# Patient Record
Sex: FEMALE | Race: OTHER | HISPANIC OR LATINO
[De-identification: names, ages, dates, MRNs, and addresses within clinical notes are randomized per-mention and may not be internally consistent; named-entity substitution may affect disease eponyms.]

---

## 2017-09-18 ENCOUNTER — APPOINTMENT (OUTPATIENT)
Dept: ORTHOPEDIC SURGERY | Facility: CLINIC | Age: 37
End: 2017-09-18
Payer: COMMERCIAL

## 2017-09-18 VITALS — DIASTOLIC BLOOD PRESSURE: 73 MMHG | SYSTOLIC BLOOD PRESSURE: 110 MMHG | HEART RATE: 82 BPM

## 2017-09-18 DIAGNOSIS — M54.2 CERVICALGIA: ICD-10-CM

## 2017-09-18 PROCEDURE — 99204 OFFICE O/P NEW MOD 45 MIN: CPT

## 2017-09-18 PROCEDURE — 72040 X-RAY EXAM NECK SPINE 2-3 VW: CPT

## 2017-09-18 RX ORDER — DICLOFENAC SODIUM 75 MG/1
75 TABLET, DELAYED RELEASE ORAL
Qty: 90 | Refills: 1 | Status: ACTIVE | COMMUNITY
Start: 2017-09-18 | End: 1900-01-01

## 2017-11-09 ENCOUNTER — RESULT REVIEW (OUTPATIENT)
Age: 37
End: 2017-11-09

## 2018-11-15 ENCOUNTER — APPOINTMENT (OUTPATIENT)
Dept: DERMATOLOGY | Facility: CLINIC | Age: 38
End: 2018-11-15

## 2018-11-20 ENCOUNTER — RESULT REVIEW (OUTPATIENT)
Age: 38
End: 2018-11-20

## 2020-04-03 ENCOUNTER — EMERGENCY (EMERGENCY)
Facility: HOSPITAL | Age: 40
LOS: 1 days | Discharge: HOME | End: 2020-04-03
Attending: EMERGENCY MEDICINE | Admitting: EMERGENCY MEDICINE
Payer: COMMERCIAL

## 2020-04-03 VITALS
TEMPERATURE: 98 F | HEART RATE: 101 BPM | OXYGEN SATURATION: 99 % | SYSTOLIC BLOOD PRESSURE: 141 MMHG | RESPIRATION RATE: 18 BRPM | DIASTOLIC BLOOD PRESSURE: 81 MMHG

## 2020-04-03 DIAGNOSIS — O34.21 MATERNAL CARE FOR SCAR FROM PREVIOUS CESAREAN DELIVERY: Chronic | ICD-10-CM

## 2020-04-03 DIAGNOSIS — Z98.82 BREAST IMPLANT STATUS: Chronic | ICD-10-CM

## 2020-04-03 DIAGNOSIS — Z98.84 BARIATRIC SURGERY STATUS: Chronic | ICD-10-CM

## 2020-04-03 DIAGNOSIS — Z90.49 ACQUIRED ABSENCE OF OTHER SPECIFIED PARTS OF DIGESTIVE TRACT: Chronic | ICD-10-CM

## 2020-04-03 DIAGNOSIS — O03.9 COMPLETE OR UNSPECIFIED SPONTANEOUS ABORTION WITHOUT COMPLICATION: Chronic | ICD-10-CM

## 2020-04-03 DIAGNOSIS — Z98.89 OTHER SPECIFIED POSTPROCEDURAL STATES: Chronic | ICD-10-CM

## 2020-04-03 PROCEDURE — 99284 EMERGENCY DEPT VISIT MOD MDM: CPT | Mod: 25

## 2020-04-03 PROCEDURE — 93010 ELECTROCARDIOGRAM REPORT: CPT | Mod: NC

## 2020-04-03 PROCEDURE — 71045 X-RAY EXAM CHEST 1 VIEW: CPT | Mod: 26

## 2020-04-03 NOTE — ED PROVIDER NOTE - NSFOLLOWUPINSTRUCTIONS_ED_ALL_ED_FT
Chest Pain    Chest pain can be caused by many different conditions which may or may not be dangerous. Causes include heartburn, lung infections, heart attack, blood clot in lungs, skin infections, strain or damage to muscle, cartilage, or bones, etc. In addition to a history and physical examination, an electrocardiogram (ECG) or other lab tests may have been performed to determine the cause of your chest pain. Follow up with your primary care provider or with a cardiologist as instructed.     SEEK IMMEDIATE MEDICAL CARE IF YOU HAVE ANY OF THE FOLLOWING SYMPTOMS: worsening chest pain, coughing up blood, unexplained back/neck/jaw pain, severe abdominal pain, dizziness or lightheadedness, fainting, shortness of breath, sweaty or clammy skin, vomiting, or racing heart beat. These symptoms may represent a serious problem that is an emergency. Do not wait to see if the symptoms will go away. Get medical help right away. Call 911 and do not drive yourself to the hospital.    - Follow up with your primary care doctor in 1-2 days.    - Bring results with you to the appointment.   - Return to the ED for new or worsening symptoms.

## 2020-04-03 NOTE — ED ADULT TRIAGE NOTE - CHIEF COMPLAINT QUOTE
A&Ox4 ambulatory c/o chest pain worse with movement and down both arms since this morning, went to urgent care but patient was still concerned. Reports has been feeling very anxious lately. Denies PMH, ekg in progress

## 2020-04-03 NOTE — ED PROVIDER NOTE - CLINICAL SUMMARY MEDICAL DECISION MAKING FREE TEXT BOX
39F w/ chest pain, non exertional, no PMH, no family history of cardiac disease, does not appear to be ACS, lungs clear, VSS, will get ekg, cxr, tbdc 39F w/ chest pain, non exertional, no PMH, no family history of cardiac disease, does not appear to be ACS, lungs clear, VSS, will get ekg, cxr, tbdc, HR 82 on EKG, w/ this heart rate patient is perc negative

## 2020-04-03 NOTE — ED PROVIDER NOTE - OBJECTIVE STATEMENT
39F w/ no PMH p/w bilateral chest pain that started last night. Non exertional. non radiating. Reports tingling down the L arm that resolved. States she has a sore throat. Reports cold symptoms 2-3 weeks ago. Denies fevers, chills, n/v/d, abdominal pain. 39F w/ no PMH p/w bilateral chest pain that started last night. Non exertional. non radiating. Reports tingling down the L arm that resolved. States she has a sore throat. Reports cold symptoms 2-3 weeks ago. Denies fevers, chills, n/v/d, abdominal pain. Denies OCP use, denies smoking

## 2020-04-03 NOTE — ED PROVIDER NOTE - PSH
Delivery with history of   2008  Gastric bypass status for obesity  2013  H/O breast augmentation  2015  H/O cosmetic plastic surgery  tummy tuck 2015  History of cholecystectomy  2001

## 2020-04-03 NOTE — ED PROVIDER NOTE - PATIENT PORTAL LINK FT
You can access the FollowMyHealth Patient Portal offered by [facility] by registering at the following website: [email or web address]. By joining [employer] [facility]’s FollowMyHealth portal, you will also be able to view your health information using other applications (apps) compatible with our system.

## 2020-04-03 NOTE — ED PROVIDER NOTE - ATTENDING CONTRIBUTION TO CARE
39F p/w chest pain bilat x 1 day, nonexertional, nonradiating.  Some L arm tingling as well as sore throat; had cold sx 2-3 weeks ago, but no fever or short of breath.  Pt well appearing, no cardiac risk factors, no family history, is  a nonsmoker, and has a normal EKG.  Unlikely ACS.  Plan check EKG, CXR, UCG, if all acceptable can d/c home f/u PMD.  VS:  unremarkable except borderline tachycardia    GEN - NAD;   well appearing;   A+O x3   HEAD - NC/AT     ENT - PEERL, EOMI, mucous membranes    moist , no discharge      NECK: Neck supple, non-tender without lymphadenopathy, no masses, no JVD  PULM - CTA b/l,  symmetric breath sounds  COR -  normal heart sounds    ABD - , ND, NT, soft,  BACK - no CVA tenderness, nontender spine     EXTREMS - no edema, no deformity, warm and well perfused    SKIN - no rash    or bruising      NEUROLOGIC - alert, face symmetric, speech fluent, sensation nl, motor no focal deficit.

## 2020-04-16 ENCOUNTER — APPOINTMENT (OUTPATIENT)
Dept: INTERNAL MEDICINE | Facility: CLINIC | Age: 40
End: 2020-04-16

## 2020-04-17 ENCOUNTER — APPOINTMENT (OUTPATIENT)
Dept: INTERNAL MEDICINE | Facility: CLINIC | Age: 40
End: 2020-04-17

## 2020-08-29 ENCOUNTER — APPOINTMENT (OUTPATIENT)
Dept: MRI IMAGING | Facility: IMAGING CENTER | Age: 40
End: 2020-08-29
Payer: COMMERCIAL

## 2020-08-29 ENCOUNTER — OUTPATIENT (OUTPATIENT)
Dept: OUTPATIENT SERVICES | Facility: HOSPITAL | Age: 40
LOS: 1 days | End: 2020-08-29
Payer: SELF-PAY

## 2020-08-29 DIAGNOSIS — O03.9 COMPLETE OR UNSPECIFIED SPONTANEOUS ABORTION WITHOUT COMPLICATION: Chronic | ICD-10-CM

## 2020-08-29 DIAGNOSIS — Z00.8 ENCOUNTER FOR OTHER GENERAL EXAMINATION: ICD-10-CM

## 2020-08-29 DIAGNOSIS — Z98.82 BREAST IMPLANT STATUS: Chronic | ICD-10-CM

## 2020-08-29 DIAGNOSIS — Z98.84 BARIATRIC SURGERY STATUS: Chronic | ICD-10-CM

## 2020-08-29 DIAGNOSIS — O34.21 MATERNAL CARE FOR SCAR FROM PREVIOUS CESAREAN DELIVERY: Chronic | ICD-10-CM

## 2020-08-29 DIAGNOSIS — Z90.49 ACQUIRED ABSENCE OF OTHER SPECIFIED PARTS OF DIGESTIVE TRACT: Chronic | ICD-10-CM

## 2020-08-29 DIAGNOSIS — Z98.89 OTHER SPECIFIED POSTPROCEDURAL STATES: Chronic | ICD-10-CM

## 2020-08-29 PROCEDURE — 70551 MRI BRAIN STEM W/O DYE: CPT | Mod: 26

## 2020-08-29 PROCEDURE — 70551 MRI BRAIN STEM W/O DYE: CPT

## 2020-08-29 PROCEDURE — 72141 MRI NECK SPINE W/O DYE: CPT | Mod: 26

## 2020-08-29 PROCEDURE — 72141 MRI NECK SPINE W/O DYE: CPT

## 2020-09-12 ENCOUNTER — TRANSCRIPTION ENCOUNTER (OUTPATIENT)
Age: 40
End: 2020-09-12

## 2020-12-08 ENCOUNTER — NON-APPOINTMENT (OUTPATIENT)
Age: 40
End: 2020-12-08

## 2020-12-08 ENCOUNTER — APPOINTMENT (OUTPATIENT)
Dept: DERMATOLOGY | Facility: CLINIC | Age: 40
End: 2020-12-08
Payer: COMMERCIAL

## 2020-12-08 DIAGNOSIS — L85.3 XEROSIS CUTIS: ICD-10-CM

## 2020-12-08 PROCEDURE — 99072 ADDL SUPL MATRL&STAF TM PHE: CPT

## 2020-12-08 PROCEDURE — 99203 OFFICE O/P NEW LOW 30 MIN: CPT

## 2021-03-22 ENCOUNTER — APPOINTMENT (OUTPATIENT)
Dept: DERMATOLOGY | Facility: CLINIC | Age: 41
End: 2021-03-22
Payer: COMMERCIAL

## 2021-03-22 DIAGNOSIS — L65.9 NONSCARRING HAIR LOSS, UNSPECIFIED: ICD-10-CM

## 2021-03-22 PROCEDURE — 99214 OFFICE O/P EST MOD 30 MIN: CPT

## 2021-03-22 PROCEDURE — 99072 ADDL SUPL MATRL&STAF TM PHE: CPT

## 2021-04-19 ENCOUNTER — APPOINTMENT (OUTPATIENT)
Dept: DERMATOLOGY | Facility: CLINIC | Age: 41
End: 2021-04-19
Payer: SELF-PAY

## 2021-04-19 DIAGNOSIS — L68.0 HIRSUTISM: ICD-10-CM

## 2021-04-19 PROCEDURE — D0062: CPT

## 2021-05-17 ENCOUNTER — APPOINTMENT (OUTPATIENT)
Dept: DERMATOLOGY | Facility: CLINIC | Age: 41
End: 2021-05-17

## 2021-05-17 ENCOUNTER — NON-APPOINTMENT (OUTPATIENT)
Age: 41
End: 2021-05-17

## 2021-05-17 VITALS — BODY MASS INDEX: 32.25 KG/M2 | HEIGHT: 63 IN | WEIGHT: 182 LBS

## 2021-09-20 ENCOUNTER — APPOINTMENT (OUTPATIENT)
Dept: DERMATOLOGY | Facility: CLINIC | Age: 41
End: 2021-09-20

## 2022-08-18 ENCOUNTER — APPOINTMENT (OUTPATIENT)
Dept: ALLERGY | Facility: CLINIC | Age: 42
End: 2022-08-18

## 2022-08-18 VITALS
OXYGEN SATURATION: 100 % | RESPIRATION RATE: 16 BRPM | WEIGHT: 183 LBS | DIASTOLIC BLOOD PRESSURE: 60 MMHG | SYSTOLIC BLOOD PRESSURE: 112 MMHG | TEMPERATURE: 98.6 F | HEART RATE: 89 BPM | HEIGHT: 63 IN | BODY MASS INDEX: 32.43 KG/M2

## 2022-08-18 DIAGNOSIS — R09.81 NASAL CONGESTION: ICD-10-CM

## 2022-08-18 DIAGNOSIS — L30.9 DERMATITIS, UNSPECIFIED: ICD-10-CM

## 2022-08-18 PROCEDURE — 95004 PERQ TESTS W/ALRGNC XTRCS: CPT

## 2022-08-18 PROCEDURE — 95018 ALL TSTG PERQ&IQ DRUGS/BIOL: CPT

## 2022-08-18 PROCEDURE — 99204 OFFICE O/P NEW MOD 45 MIN: CPT | Mod: 25

## 2022-08-18 RX ORDER — FLUTICASONE PROPIONATE 50 UG/1
50 SPRAY, METERED NASAL DAILY
Qty: 1 | Refills: 2 | Status: ACTIVE | COMMUNITY
Start: 2022-08-18 | End: 1900-01-01

## 2022-09-07 ENCOUNTER — APPOINTMENT (OUTPATIENT)
Dept: ALLERGY | Facility: CLINIC | Age: 42
End: 2022-09-07

## 2022-09-07 VITALS
WEIGHT: 185 LBS | DIASTOLIC BLOOD PRESSURE: 72 MMHG | SYSTOLIC BLOOD PRESSURE: 106 MMHG | HEART RATE: 75 BPM | HEIGHT: 63 IN | BODY MASS INDEX: 32.78 KG/M2 | OXYGEN SATURATION: 99 % | TEMPERATURE: 98.5 F

## 2022-09-07 PROCEDURE — 95004 PERQ TESTS W/ALRGNC XTRCS: CPT

## 2022-09-07 PROCEDURE — 95018 ALL TSTG PERQ&IQ DRUGS/BIOL: CPT

## 2022-09-07 PROCEDURE — 99213 OFFICE O/P EST LOW 20 MIN: CPT | Mod: 25

## 2022-09-07 NOTE — HISTORY OF PRESENT ILLNESS
[Asthma] : asthma [Venom Reactions] : venom reactions [Food Allergies] : food allergies [de-identified] : Stuffy nose - left greater than right - ongoing x two months - she takes no medications for her symptoms    No history of nasal allergies in the past.   No history of asthma. \par \par Patient feels that after eating at times she has a tightness in her throat - symptoms last for about a few hours and resolves without treatment.   No associated GERD symptoms. \par \par  - latex balloons - hands become dry and/or itchy if working with latex balloons - she washes her hands and she improves. She does not blow up balloons.   She has not had any problems at dentist or gynecologist - no latex condom exposure. \par \par Avocado, alfred, kiwi- no problem eating\par She does not eat banana - chestnut - papaya.\par \par Chocolate worsens her throat symptoms.

## 2022-09-07 NOTE — ASSESSMENT
[FreeTextEntry1] : Perennial allergic rhinitis - \par \par Continue Flonase 2 puffs QD\par Obtain latex ImmunoCAP

## 2022-09-07 NOTE — HISTORY OF PRESENT ILLNESS
[de-identified] : Patient has not had her latex blood test as of yet - she has improved with Flonase - less congestion

## 2022-09-07 NOTE — PHYSICAL EXAM
[Alert] : alert [Well Nourished] : well nourished [Healthy Appearance] : healthy appearance [No Acute Distress] : no acute distress [Well Developed] : well developed [Normal Voice/Communication] : normal voice communication [No Neck Mass] : no neck mass was observed [No LAD] : no lymphadenopathy [No Thyroid Mass] : no thyroid mass [Supple] : the neck was supple [Normal Rate and Effort] : normal respiratory rhythm and effort [No Retractions] : no retractions [Normal Rate] : heart rate was normal  [Normal S1, S2] : normal S1 and S2 [No murmur] : no murmur [Regular Rhythm] : with a regular rhythm [Skin Intact] : skin intact  [No Rash] : no rash [No clubbing] : no clubbing [No Cyanosis] : no cyanosis [Normal Mood] : mood was normal [Normal Affect] : affect was normal [Judgment and Insight Age Appropriate] : judgement and insight is age appropriate [Alert, Awake, Oriented as Age-Appropriate] : alert, awake, oriented as age appropriate [Wheezing] : no wheezing was heard

## 2022-09-07 NOTE — SOCIAL HISTORY
[Spouse/Partner] : spouse/partner [House] : [unfilled] lives in a house  [Central Forced Air] : heating provided by central forced air [Central] : air conditioning provided by central unit [Dog] : dog [] :  [de-identified] : 2 children  [FreeTextEntry2] :   [Bedroom] : not in the bedroom [Basement] : not in the basement [Living Area] : not in the living area [Smokers in Household] : there are no smokers in the home

## 2022-09-07 NOTE — ASSESSMENT
[FreeTextEntry1] : Perennial allergic rhinitis:\par \par Nasamist - hypertonic saline BID \par Flonase 2 puffs QD \par RV rhinoscopy \par \par Doubt food allergies but patient concerned and RV food skin testing \par \par Possible latex allergy - \par \par ImmunoCAP latex \par

## 2022-09-07 NOTE — PHYSICAL EXAM
[Alert] : alert [Well Nourished] : well nourished [Healthy Appearance] : healthy appearance [No Acute Distress] : no acute distress [Well Developed] : well developed [Normal Voice/Communication] : normal voice communication [No Neck Mass] : no neck mass was observed [No LAD] : no lymphadenopathy [No Thyroid Mass] : no thyroid mass [Supple] : the neck was supple [Normal Rate and Effort] : normal respiratory rhythm and effort [No Retractions] : no retractions [Wheezing] : no wheezing was heard [Normal Rate] : heart rate was normal  [Normal S1, S2] : normal S1 and S2 [No murmur] : no murmur [Regular Rhythm] : with a regular rhythm [Skin Intact] : skin intact  [No Rash] : no rash [No clubbing] : no clubbing [No Cyanosis] : no cyanosis [Normal Mood] : mood was normal [Normal Affect] : affect was normal [Judgment and Insight Age Appropriate] : judgement and insight is age appropriate [Alert, Awake, Oriented as Age-Appropriate] : alert, awake, oriented as age appropriate

## 2022-09-07 NOTE — SOCIAL HISTORY
[Spouse/Partner] : spouse/partner [de-identified] : 2 children  [FreeTextEntry2] :   [House] : [unfilled] lives in a house  [Central Forced Air] : heating provided by central forced air [Central] : air conditioning provided by central unit [Bedroom] : not in the bedroom [Basement] : not in the basement [Living Area] : not in the living area [Dog] : dog [] :  [Smokers in Household] : there are no smokers in the home

## 2022-09-28 ENCOUNTER — APPOINTMENT (OUTPATIENT)
Dept: ALLERGY | Facility: CLINIC | Age: 42
End: 2022-09-28

## 2023-03-21 ENCOUNTER — APPOINTMENT (OUTPATIENT)
Dept: OBGYN | Facility: CLINIC | Age: 43
End: 2023-03-21

## 2023-04-14 ENCOUNTER — APPOINTMENT (OUTPATIENT)
Dept: OBGYN | Facility: CLINIC | Age: 43
End: 2023-04-14
Payer: COMMERCIAL

## 2023-04-14 PROCEDURE — 76830 TRANSVAGINAL US NON-OB: CPT

## 2023-04-14 PROCEDURE — 81002 URINALYSIS NONAUTO W/O SCOPE: CPT

## 2023-04-14 PROCEDURE — 82270 OCCULT BLOOD FECES: CPT

## 2023-04-14 PROCEDURE — 99396 PREV VISIT EST AGE 40-64: CPT

## 2023-06-19 ENCOUNTER — OUTPATIENT (OUTPATIENT)
Dept: OUTPATIENT SERVICES | Facility: HOSPITAL | Age: 43
LOS: 1 days | End: 2023-06-19
Payer: COMMERCIAL

## 2023-06-19 ENCOUNTER — APPOINTMENT (OUTPATIENT)
Dept: CT IMAGING | Facility: CLINIC | Age: 43
End: 2023-06-19
Payer: COMMERCIAL

## 2023-06-19 DIAGNOSIS — O03.9 COMPLETE OR UNSPECIFIED SPONTANEOUS ABORTION WITHOUT COMPLICATION: Chronic | ICD-10-CM

## 2023-06-19 DIAGNOSIS — Z98.89 OTHER SPECIFIED POSTPROCEDURAL STATES: Chronic | ICD-10-CM

## 2023-06-19 DIAGNOSIS — Z98.82 BREAST IMPLANT STATUS: Chronic | ICD-10-CM

## 2023-06-19 DIAGNOSIS — R10.84 GENERALIZED ABDOMINAL PAIN: ICD-10-CM

## 2023-06-19 DIAGNOSIS — Z90.49 ACQUIRED ABSENCE OF OTHER SPECIFIED PARTS OF DIGESTIVE TRACT: Chronic | ICD-10-CM

## 2023-06-19 DIAGNOSIS — O34.21 MATERNAL CARE FOR SCAR FROM PREVIOUS CESAREAN DELIVERY: Chronic | ICD-10-CM

## 2023-06-19 DIAGNOSIS — Z98.84 BARIATRIC SURGERY STATUS: Chronic | ICD-10-CM

## 2023-06-19 PROCEDURE — 74177 CT ABD & PELVIS W/CONTRAST: CPT

## 2023-06-19 PROCEDURE — 74177 CT ABD & PELVIS W/CONTRAST: CPT | Mod: 26

## 2023-07-17 ENCOUNTER — APPOINTMENT (OUTPATIENT)
Dept: MRI IMAGING | Facility: CLINIC | Age: 43
End: 2023-07-17

## 2024-03-18 ENCOUNTER — APPOINTMENT (OUTPATIENT)
Dept: DERMATOLOGY | Facility: CLINIC | Age: 44
End: 2024-03-18

## 2024-04-16 ENCOUNTER — APPOINTMENT (OUTPATIENT)
Dept: OBGYN | Facility: CLINIC | Age: 44
End: 2024-04-16
Payer: COMMERCIAL

## 2024-04-16 PROCEDURE — 99396 PREV VISIT EST AGE 40-64: CPT

## 2024-04-16 PROCEDURE — 76830 TRANSVAGINAL US NON-OB: CPT

## 2024-04-16 PROCEDURE — 81002 URINALYSIS NONAUTO W/O SCOPE: CPT

## 2024-04-16 PROCEDURE — 99459 PELVIC EXAMINATION: CPT

## 2024-09-14 ENCOUNTER — NON-APPOINTMENT (OUTPATIENT)
Age: 44
End: 2024-09-14

## 2025-06-12 ENCOUNTER — APPOINTMENT (OUTPATIENT)
Dept: OBGYN | Facility: CLINIC | Age: 45
End: 2025-06-12

## 2025-09-04 ENCOUNTER — APPOINTMENT (OUTPATIENT)
Dept: OBGYN | Facility: CLINIC | Age: 45
End: 2025-09-04
Payer: COMMERCIAL

## 2025-09-04 PROCEDURE — 81002 URINALYSIS NONAUTO W/O SCOPE: CPT

## 2025-09-04 PROCEDURE — 99396 PREV VISIT EST AGE 40-64: CPT

## 2025-09-04 PROCEDURE — 76830 TRANSVAGINAL US NON-OB: CPT

## 2025-09-04 PROCEDURE — 99459 PELVIC EXAMINATION: CPT
